# Patient Record
Sex: MALE | Race: WHITE | NOT HISPANIC OR LATINO | Employment: FULL TIME | URBAN - METROPOLITAN AREA
[De-identification: names, ages, dates, MRNs, and addresses within clinical notes are randomized per-mention and may not be internally consistent; named-entity substitution may affect disease eponyms.]

---

## 2022-01-26 ENCOUNTER — OFFICE VISIT (OUTPATIENT)
Dept: URGENT CARE | Facility: PHYSICIAN GROUP | Age: 22
End: 2022-01-26
Payer: COMMERCIAL

## 2022-01-26 VITALS
HEIGHT: 72 IN | WEIGHT: 203 LBS | HEART RATE: 95 BPM | SYSTOLIC BLOOD PRESSURE: 118 MMHG | DIASTOLIC BLOOD PRESSURE: 86 MMHG | OXYGEN SATURATION: 97 % | RESPIRATION RATE: 20 BRPM | TEMPERATURE: 97.8 F | BODY MASS INDEX: 27.5 KG/M2

## 2022-01-26 DIAGNOSIS — M54.32 SCIATICA OF LEFT SIDE: ICD-10-CM

## 2022-01-26 PROCEDURE — 99214 OFFICE O/P EST MOD 30 MIN: CPT | Performed by: NURSE PRACTITIONER

## 2022-01-26 RX ORDER — KETOROLAC TROMETHAMINE 30 MG/ML
30 INJECTION, SOLUTION INTRAMUSCULAR; INTRAVENOUS ONCE
Status: COMPLETED | OUTPATIENT
Start: 2022-01-26 | End: 2022-01-26

## 2022-01-26 RX ORDER — CYCLOBENZAPRINE HCL 5 MG
5-10 TABLET ORAL
Qty: 15 TABLET | Refills: 0 | Status: SHIPPED | OUTPATIENT
Start: 2022-01-26 | End: 2022-01-31

## 2022-01-26 RX ADMIN — KETOROLAC TROMETHAMINE 30 MG: 30 INJECTION, SOLUTION INTRAMUSCULAR; INTRAVENOUS at 14:15

## 2022-01-26 ASSESSMENT — ENCOUNTER SYMPTOMS
BOWEL INCONTINENCE: 0
PARESTHESIAS: 1
TINGLING: 1
NUMBNESS: 1
LEG PAIN: 1
BACK PAIN: 1

## 2022-01-26 NOTE — PROGRESS NOTES
Subjective:     Manuel Leon is a 21 y.o. male who presents for Back Pain (cant sit, consitant cramping in lower back, numb left leg x1 hour ago)      Back Pain  This is a new problem. The current episode started today (Bennie is a pleasant 21 year old male who presents to  today with complaints of left sided back pain that started today while pumping gas. He states he developed severe left lower back pain that radiates down left buttock and thigh. ). The pain is present in the lumbar spine and sacro-iliac. The quality of the pain is described as aching and shooting. The pain is at a severity of 9/10. Associated symptoms include leg pain, numbness, paresthesias and tingling. Pertinent negatives include no bladder incontinence or bowel incontinence. He has tried ice for the symptoms. The treatment provided mild relief.     He states he does have a history of left sided lumbar pain for the past 2 years. This pain is intermittent an dis usually resolves with stretching.     Review of Systems   Gastrointestinal: Negative for bowel incontinence.   Genitourinary: Negative for bladder incontinence.   Musculoskeletal: Positive for back pain.   Neurological: Positive for tingling, numbness and paresthesias.       PMH: History reviewed. No pertinent past medical history.  ALLERGIES: No Known Allergies  SURGHX: History reviewed. No pertinent surgical history.  SOCHX:   Social History     Socioeconomic History   • Marital status: Single     Spouse name: Not on file   • Number of children: Not on file   • Years of education: Not on file   • Highest education level: Not on file   Occupational History   • Not on file   Tobacco Use   • Smoking status: Never Smoker   • Smokeless tobacco: Never Used   Vaping Use   • Vaping Use: Never used   Substance and Sexual Activity   • Alcohol use: Yes     Comment: socially    • Drug use: Not Currently   • Sexual activity: Not on file   Other Topics Concern   • Not on file   Social History  Narrative   • Not on file     Social Determinants of Health     Financial Resource Strain:    • Difficulty of Paying Living Expenses: Not on file   Food Insecurity:    • Worried About Running Out of Food in the Last Year: Not on file   • Ran Out of Food in the Last Year: Not on file   Transportation Needs:    • Lack of Transportation (Medical): Not on file   • Lack of Transportation (Non-Medical): Not on file   Physical Activity:    • Days of Exercise per Week: Not on file   • Minutes of Exercise per Session: Not on file   Stress:    • Feeling of Stress : Not on file   Social Connections:    • Frequency of Communication with Friends and Family: Not on file   • Frequency of Social Gatherings with Friends and Family: Not on file   • Attends Worship Services: Not on file   • Active Member of Clubs or Organizations: Not on file   • Attends Club or Organization Meetings: Not on file   • Marital Status: Not on file   Intimate Partner Violence:    • Fear of Current or Ex-Partner: Not on file   • Emotionally Abused: Not on file   • Physically Abused: Not on file   • Sexually Abused: Not on file   Housing Stability:    • Unable to Pay for Housing in the Last Year: Not on file   • Number of Places Lived in the Last Year: Not on file   • Unstable Housing in the Last Year: Not on file     FH:   Family History   Problem Relation Age of Onset   • Non-contributory Mother    • Non-contributory Father          Objective:   /86   Pulse 95   Temp 36.6 °C (97.8 °F) (Temporal)   Resp 20   Ht 1.829 m (6')   Wt 92.1 kg (203 lb)   SpO2 97%   BMI 27.53 kg/m²     Physical Exam  Vitals and nursing note reviewed.   Constitutional:       General: He is not in acute distress.     Appearance: Normal appearance. He is not ill-appearing.   HENT:      Head: Normocephalic and atraumatic.      Right Ear: External ear normal.      Left Ear: External ear normal.      Nose: No congestion or rhinorrhea.      Mouth/Throat:      Mouth: Mucous  membranes are moist.   Eyes:      Extraocular Movements: Extraocular movements intact.      Pupils: Pupils are equal, round, and reactive to light.   Cardiovascular:      Rate and Rhythm: Normal rate and regular rhythm.      Pulses: Normal pulses.      Heart sounds: Normal heart sounds.   Pulmonary:      Effort: Pulmonary effort is normal.      Breath sounds: Normal breath sounds.   Abdominal:      General: Abdomen is flat. Bowel sounds are normal.      Palpations: Abdomen is soft.      Tenderness: There is no abdominal tenderness. There is no right CVA tenderness or left CVA tenderness.   Musculoskeletal:      Cervical back: Normal range of motion and neck supple.      Lumbar back: Tenderness present. No swelling, edema, deformity, signs of trauma, lacerations, spasms or bony tenderness. Decreased range of motion. Positive right straight leg raise test and positive left straight leg raise test. No scoliosis.        Back:       Comments: Positive antalgic gait and difficulty getting on and off exam table.    Skin:     General: Skin is warm and dry.      Capillary Refill: Capillary refill takes less than 2 seconds.   Neurological:      General: No focal deficit present.      Mental Status: He is alert and oriented to person, place, and time. Mental status is at baseline.   Psychiatric:         Mood and Affect: Mood normal.         Behavior: Behavior normal.         Thought Content: Thought content normal.         Judgment: Judgment normal.         Assessment/Plan:   Assessment    1. Sciatica of left side  ketorolac (TORADOL) injection 30 mg    cyclobenzaprine (FLEXERIL) 5 mg tablet    Referral to Physical Therapy     I encouraged pt to use supportive measures including rest, massage and performing stretches. Apply heat and ice for additional relief. Toradol injection given in clinic. Pt tolerated this well. Pt was advised to refrain from additional NSAIDS for the next 48 hours following this injection. Acetaminophen  may be used every 4 hours as needed for additional relief of pain. Pt educated not to exceed more than advised amount on bottle. Muscle relaxer sent to pharmacy for relief of back spasms. We discussed sedative effect of this medication and pt was advised to use only before bed.  Pt verbalizes understanding.  Follow up in clinic for worsening or persistent symptoms.     AVS handout given and reviewed with patient. Pt educated on red flags and when to seek treatment back in ER or UC.

## 2022-01-26 NOTE — PATIENT INSTRUCTIONS
Lumbar Strain  A lumbar strain, which is sometimes called a low-back strain, is a stretch or tear in a muscle or the strong cords of tissue that attach muscle to bone (tendons) in the lower back (lumbar spine). This type of injury occurs when muscles or tendons are torn or are stretched beyond their limits.  Lumbar strains can range from mild to severe. Mild strains may involve stretching a muscle or tendon without tearing it. These may heal in 1-2 weeks. More severe strains involve tearing of muscle fibers or tendons. These will cause more pain and may take 6-8 weeks to heal.  What are the causes?  This condition may be caused by:  · Trauma, such as a fall or a hit to the body.  · Twisting or overstretching the back. This may result from doing activities that need a lot of energy, such as lifting heavy objects.  What increases the risk?  This injury is more common in:  · Athletes.  · People with obesity.  · People who do repeated lifting, bending, or other movements that involve their back.  What are the signs or symptoms?  Symptoms of this condition may include:  · Sharp or dull pain in the lower back that does not go away. The pain may extend to the buttocks.  · Stiffness or limited range of motion.  · Sudden muscle tightening (spasms).  How is this diagnosed?  This condition may be diagnosed based on:  · Your symptoms.  · Your medical history.  · A physical exam.  · Imaging tests, such as:  ? X-rays.  ? MRI.  How is this treated?  Treatment for this condition may include:  · Rest.  · Applying heat and cold to the affected area.  · Over-the-counter medicines to help relieve pain and inflammation, such as NSAIDs.  · Prescription pain medicine and muscle relaxants may be needed for a short time.  · Physical therapy.  Follow these instructions at home:  Managing pain, stiffness, and swelling         · If directed, put ice on the injured area during the first 24 hours after your injury.  ? Put ice in a plastic  bag.  ? Place a towel between your skin and the bag.  ? Leave the ice on for 20 minutes, 2-3 times a day.  · If directed, apply heat to the affected area as often as told by your health care provider. Use the heat source that your health care provider recommends, such as a moist heat pack or a heating pad.  ? Place a towel between your skin and the heat source.  ? Leave the heat on for 20-30 minutes.  ? Remove the heat if your skin turns bright red. This is especially important if you are unable to feel pain, heat, or cold. You may have a greater risk of getting burned.  Activity  · Rest and return to your normal activities as told by your health care provider. Ask your health care provider what activities are safe for you.  · Do exercises as told by your health care provider.  Medicines  · Take over-the-counter and prescription medicines only as told by your health care provider.  · Ask your health care provider if the medicine prescribed to you:  ? Requires you to avoid driving or using heavy machinery.  ? Can cause constipation. You may need to take these actions to prevent or treat constipation:  § Drink enough fluid to keep your urine pale yellow.  § Take over-the-counter or prescription medicines.  § Eat foods that are high in fiber, such as beans, whole grains, and fresh fruits and vegetables.  § Limit foods that are high in fat and processed sugars, such as fried or sweet foods.  Injury prevention  To prevent a future low-back injury:  · Always warm up properly before physical activity or sports.  · Cool down and stretch after being active.  · Use correct form when playing sports and lifting heavy objects. Bend your knees before you lift heavy objects.  · Use good posture when sitting and standing.  · Stay physically fit and keep a healthy weight.  ? Do at least 150 minutes of moderate-intensity exercise each week, such as brisk walking or water aerobics.  ? Do strength exercises at least 2 times each  week.    General instructions  · Do not use any products that contain nicotine or tobacco, such as cigarettes, e-cigarettes, and chewing tobacco. If you need help quitting, ask your health care provider.  · Keep all follow-up visits as told by your health care provider. This is important.  Contact a health care provider if:  · Your back pain does not improve after 6 weeks of treatment.  · Your symptoms get worse.  Get help right away if:  · Your back pain is severe.  · You are unable to stand or walk.  · You develop pain in your legs.  · You develop weakness in your buttocks or legs.  · You have difficulty controlling when you urinate or when you have a bowel movement.  ? You have frequent, painful, or bloody urination.  ? You have a temperature over 101.0°F (38.3°C)  Summary  · A lumbar strain, which is sometimes called a low-back strain, is a stretch or tear in a muscle or the strong cords of tissue that attach muscle to bone (tendons) in the lower back (lumbar spine).  · This type of injury occurs when muscles or tendons are torn or are stretched beyond their limits.  · Rest and return to your normal activities as told by your health care provider. If directed, apply heat and ice to the affected area as often as told by your health care provider.  · Take over-the-counter and prescription medicines only as told by your health care provider.  · Contact a health care provider if you have new or worsening symptoms.  This information is not intended to replace advice given to you by your health care provider. Make sure you discuss any questions you have with your health care provider.  Document Released: 12/18/2006 Document Revised: 10/17/2019 Document Reviewed: 10/17/2019  Elsevier Patient Education © 2020 [employer]  Sciatica    Sciatica is pain, weakness, tingling, or loss of feeling (numbness) along the sciatic nerve. The sciatic nerve starts in the lower back and goes down the back of each leg. Sciatica  usually goes away on its own or with treatment. Sometimes, sciatica may come back (recur).  What are the causes?  This condition happens when the sciatic nerve is pinched or has pressure put on it. This may be the result of:  · A disk in between the bones of the spine bulging out too far (herniated disk).  · Changes in the spinal disks that occur with aging.  · A condition that affects a muscle in the butt.  · Extra bone growth near the sciatic nerve.  · A break (fracture) of the area between your hip bones (pelvis).  · Pregnancy.  · Tumor. This is rare.  What increases the risk?  You are more likely to develop this condition if you:  · Play sports that put pressure or stress on the spine.  · Have poor strength and ease of movement (flexibility).  · Have had a back injury in the past.  · Have had back surgery.  · Sit for long periods of time.  · Do activities that involve bending or lifting over and over again.  · Are very overweight (obese).  What are the signs or symptoms?  Symptoms can vary from mild to very bad. They may include:  · Any of these problems in the lower back, leg, hip, or butt:  ? Mild tingling, loss of feeling, or dull aches.  ? Burning sensations.  ? Sharp pains.  · Loss of feeling in the back of the calf or the sole of the foot.  · Leg weakness.  · Very bad back pain that makes it hard to move.  These symptoms may get worse when you cough, sneeze, or laugh. They may also get worse when you sit or stand for long periods of time.  How is this treated?  This condition often gets better without any treatment. However, treatment may include:  · Changing or cutting back on physical activity when you have pain.  · Doing exercises and stretching.  · Putting ice or heat on the affected area.  · Medicines that help:  ? To relieve pain and swelling.  ? To relax your muscles.  · Shots (injections) of medicines that help to relieve pain, irritation, and swelling.  · Surgery.  Follow these instructions at  home:  Medicines  · Take over-the-counter and prescription medicines only as told by your doctor.  · Ask your doctor if the medicine prescribed to you:  ? Requires you to avoid driving or using heavy machinery.  ? Can cause trouble pooping (constipation). You may need to take these steps to prevent or treat trouble pooping:  § Drink enough fluids to keep your pee (urine) pale yellow.  § Take over-the-counter or prescription medicines.  § Eat foods that are high in fiber. These include beans, whole grains, and fresh fruits and vegetables.  § Limit foods that are high in fat and sugar. These include fried or sweet foods.  Managing pain         · If told, put ice on the affected area.  ? Put ice in a plastic bag.  ? Place a towel between your skin and the bag.  ? Leave the ice on for 20 minutes, 2-3 times a day.  · If told, put heat on the affected area. Use the heat source that your doctor tells you to use, such as a moist heat pack or a heating pad.  ? Place a towel between your skin and the heat source.  ? Leave the heat on for 20-30 minutes.  ? Remove the heat if your skin turns bright red. This is very important if you are unable to feel pain, heat, or cold. You may have a greater risk of getting burned.  Activity    · Return to your normal activities as told by your doctor. Ask your doctor what activities are safe for you.  · Avoid activities that make your symptoms worse.  · Take short rests during the day.  ? When you rest for a long time, do some physical activity or stretching between periods of rest.  ? Avoid sitting for a long time without moving. Get up and move around at least one time each hour.  · Exercise and stretch regularly, as told by your doctor.  · Do not lift anything that is heavier than 10 lb (4.5 kg) while you have symptoms of sciatica.  ? Avoid lifting heavy things even when you do not have symptoms.  ? Avoid lifting heavy things over and over.  · When you lift objects, always lift in a way  that is safe for your body. To do this, you should:  ? Bend your knees.  ? Keep the object close to your body.  ? Avoid twisting.  General instructions  · Stay at a healthy weight.  · Wear comfortable shoes that support your feet. Avoid wearing high heels.  · Avoid sleeping on a mattress that is too soft or too hard. You might have less pain if you sleep on a mattress that is firm enough to support your back.  · Keep all follow-up visits as told by your doctor. This is important.  Contact a doctor if:  · You have pain that:  ? Wakes you up when you are sleeping.  ? Gets worse when you lie down.  ? Is worse than the pain you have had in the past.  ? Lasts longer than 4 weeks.  · You lose weight without trying.  Get help right away if:  · You cannot control when you pee (urinate) or poop (have a bowel movement).  · You have weakness in any of these areas and it gets worse:  ? Lower back.  ? The area between your hip bones.  ? Butt.  ? Legs.  · You have redness or swelling of your back.  · You have a burning feeling when you pee.  Summary  · Sciatica is pain, weakness, tingling, or loss of feeling (numbness) along the sciatic nerve.  · This condition happens when the sciatic nerve is pinched or has pressure put on it.  · Sciatica can cause pain, tingling, or loss of feeling (numbness) in the lower back, legs, hips, and butt.  · Treatment often includes rest, exercise, medicines, and putting ice or heat on the affected area.  This information is not intended to replace advice given to you by your health care provider. Make sure you discuss any questions you have with your health care provider.  Document Released: 09/26/2009 Document Revised: 01/06/2020 Document Reviewed: 01/06/2020  Elsevier Patient Education © 2020 [employer] Inc[employer]

## 2022-01-27 ENCOUNTER — HOSPITAL ENCOUNTER (EMERGENCY)
Facility: MEDICAL CENTER | Age: 22
End: 2022-01-27
Attending: EMERGENCY MEDICINE
Payer: COMMERCIAL

## 2022-01-27 VITALS
SYSTOLIC BLOOD PRESSURE: 136 MMHG | TEMPERATURE: 99 F | HEIGHT: 72 IN | OXYGEN SATURATION: 96 % | WEIGHT: 203.48 LBS | RESPIRATION RATE: 18 BRPM | HEART RATE: 86 BPM | BODY MASS INDEX: 27.56 KG/M2 | DIASTOLIC BLOOD PRESSURE: 72 MMHG

## 2022-01-27 DIAGNOSIS — M54.32 SCIATICA OF LEFT SIDE: Primary | ICD-10-CM

## 2022-01-27 PROCEDURE — 99282 EMERGENCY DEPT VISIT SF MDM: CPT

## 2022-01-27 ASSESSMENT — ENCOUNTER SYMPTOMS
ABDOMINAL PAIN: 0
BACK PAIN: 1

## 2022-01-28 NOTE — ED PROVIDER NOTES
ED Provider Note    Scribed for JONATHAN Jain II* by Maritza Vicente. 1/27/2022  4:59 PM    Means of Arrival: Walk in   History obtained by: Patient  Limitations: None     CHIEF COMPLAINT  Chief Complaint   Patient presents with   • Low Back Pain     shoots down L leg       HPI  Manuel Leon is a 21 y.o. male who presents to the Emergency Department for evaluation of worsening radiating lower back pain onset 2 days ago. Patient reports he has history of lower back pain and was seen at Urgent Care yesterday after experiencing a sudden shooting pain going down his left leg from his lower back. At Urgent Care, he was administered Flexeril and Toradol.  He adds that this episode of back pain is worse than his usual episodes. In the ED, he feels more improved than onset. He adds that he went to the [employer] 4 days ago where he states a kid jumped on him. He states that he didn't feel any immediate pain. Patient states that his pain worsens when he tries to walk or stand up and his pain is alleviated with laying down. He presents associated symptoms of testicular pain.  Dull ache.  Denies lower abdominal pain, dysuria, or penile discharge. He denies any new sexual partners.  Patient has been taking Tylenol intermittently with mild alleviation.  He took Flexeril and did not like how it made him feel drowsy.      REVIEW OF SYSTEMS  Review of Systems   Gastrointestinal: Negative for abdominal pain (lower).   Genitourinary: Negative for dysuria.        Testicular pain  No penile discharge   Musculoskeletal: Positive for back pain (lower ).        Left leg pain   All other systems reviewed and are negative.    See HPI for further details.    PAST MEDICAL HISTORY   Previous episodes of sciatica    SOCIAL HISTORY  Social History     Tobacco Use   • Smoking status: Never Smoker   • Smokeless tobacco: Never Used   Vaping Use   • Vaping Use: Never used   Substance and Sexual Activity   • Alcohol use: Yes      Comment: socially    • Drug use: Not Currently   • Sexual activity:        SURGICAL HISTORY  patient denies any surgical history    CURRENT MEDICATIONS  Current Outpatient Medications   Medication Instructions   • cyclobenzaprine (FLEXERIL) 5-10 mg, Oral, NIGHTLY PRN - MAY REPEAT  X 1        ALLERGIES  No Known Allergies    PHYSICAL EXAM  VITAL SIGNS: BP (!) 163/104   Pulse (!) 105   Temp 36.7 °C (98 °F) (Temporal)   Resp 18   Ht 1.829 m (6')   Wt 92.3 kg (203 lb 7.8 oz)   SpO2 97%   BMI 27.60 kg/m²     Pulse ox interpretation: I interpret this pulse ox as normal.  Constitutional: Well-nourished 21-year-old man appears to be in pain.  HENT: No signs of trauma, Bilateral external ears normal, Nose normal.   Eyes: Pupils are equal, Conjunctiva normal, Non-icteric.   Cardiovascular: Regular rate and rhythm, no murmurs. Symmetric distal pulses. No cyanosis of extremities. No peripheral edema of extremities.  Thorax & Lungs: Normal breath sounds, No respiratory distress, No wheezing, No chest tenderness.   Abdomen: Bowel sounds normal, Soft, no abdominal tenderness.  : Normal scrotum and testicles with no discharge. No hernias.  No lymphadenopathy at the inguinal region.  Skin: Warm, Dry, No erythema, No rash.   Back: No midline bony tenderness, No CVA tenderness. Straight leg raise irritates back  Musculoskeletal: Good range of motion in all major joints.   Neurologic: Alert , Normal sensory function, No focal deficits noted. Reflexes in lower extremities and deep tendon reflex are equal. Brisk bilateral pulses.   Psychiatric: Affect normal, Judgment normal, Mood normal.     COURSE & MEDICAL DECISION MAKING  Pertinent Labs & Imaging studies reviewed. (See chart for details)    4:59 PM This is a 21 y.o. male who presents with history of lower back pain presenting with radiating lower back pain onset 2 days ago. Patient evaluated at bedside. Differential diagnoses include but not limited to: Sciatica, or lumbar  strain.  Presentation very consistent with sciatica.  Informed patient to stay mobile to avoid muscle tightening which can increase patient's pain. Counseled patient on OTC prescriptions and its dosages for alleviation. Discussed with patient about possible outpatient follow up with [facility] if he has persistent minor symptoms.  If he has weakness, fevers he needs to come back to the ER.  He did mention having a dull ache at his testicles.   exam is normal.  I discussed with patient care of plan following discharge. Patient was given opportunity to ask questions at this time.  Patient verbalizes understanding and agrees to care of plan.  Patient will be discharged at this time. Patient will be discharged with a referral to Spine and ED. His vital signs prior to discharge: BP (!) 163/104   Pulse (!) 105   Temp 36.7 °C (98 °F) (Temporal)   Resp 18   Ht 1.829 m (6')   Wt 92.3 kg (203 lb 7.8 oz)   SpO2 97%   BMI 27.60 kg/m²     The patient will return for worsening symptoms and is stable at the time of discharge. The patient verbalizes understanding and will comply. Guidance provided on appropriate use of medications.      DISPOSITION:  Patient will be discharged home in stable condition.    FOLLOW UP:  Atilio Maravilla M.D.  [address]  [address]  [facility] [address] [phone]    Schedule an appointment as soon as possible for a visit   If pain persists for more than 1 week, and no serious additional symptoms such as fever, loss of strength    [facility], Emergency Dept  [address] [address] [phone]  Go to   fever, weakness in leg    FINAL IMPRESSION  1. Sciatica of left side Active      Maritza VERMA (Layla), am scribing for, and in the presence of, MIKEY Jain II.    Electronically signed by: Maritza Woodall), 1/27/2022    Krishna VERMA II, M* personally performed the services described in this documentation, as  scribed by Maritza Vicente in my presence, and it is both accurate and complete.    E    The note accurately reflects work and decisions made by me.  Krishna Mathews II, M.D.  1/28/2022  12:23 AM

## 2022-01-28 NOTE — DISCHARGE INSTRUCTIONS
For pain take ibuprofen (advil) 600mg every 8 hours for at least the next 3-5 days. Be sure to take with food. You can also take 650mg of tylenol every 8 hours for additional treatment of pain. Take prescribed flexeril before bed.

## 2022-01-28 NOTE — ED TRIAGE NOTES
Ambulates to triage  Chief Complaint   Patient presents with   • Low Back Pain     shoots down L leg     Pt has a hx of this back pain, went to a [employer] park 4 days ago, did not have pain at that time. After pumping gas yesterday he had a shooting pain.   Was seen at  yesterday, and was given flexeril and a toradol injection.

## 2022-11-10 ENCOUNTER — OFFICE VISIT (OUTPATIENT)
Dept: URGENT CARE | Facility: PHYSICIAN GROUP | Age: 22
End: 2022-11-10
Payer: COMMERCIAL

## 2022-11-10 VITALS
TEMPERATURE: 98.1 F | HEART RATE: 85 BPM | SYSTOLIC BLOOD PRESSURE: 136 MMHG | WEIGHT: 208 LBS | BODY MASS INDEX: 28.17 KG/M2 | DIASTOLIC BLOOD PRESSURE: 78 MMHG | RESPIRATION RATE: 16 BRPM | HEIGHT: 72 IN | OXYGEN SATURATION: 97 %

## 2022-11-10 DIAGNOSIS — M54.32 SCIATICA OF LEFT SIDE: ICD-10-CM

## 2022-11-10 PROCEDURE — 99214 OFFICE O/P EST MOD 30 MIN: CPT | Performed by: FAMILY MEDICINE

## 2022-11-10 RX ORDER — PREDNISONE 20 MG/1
40 TABLET ORAL DAILY
Qty: 10 TABLET | Refills: 0 | Status: SHIPPED | OUTPATIENT
Start: 2022-11-10 | End: 2022-11-15

## 2022-11-10 ASSESSMENT — ENCOUNTER SYMPTOMS
VOMITING: 0
NAUSEA: 0
MYALGIAS: 0
WEIGHT LOSS: 0
EYE DISCHARGE: 0
EYE REDNESS: 0

## 2022-11-10 NOTE — LETTER
November 10, 2022         Patient: Manuel Leon   YOB: 2000   Date of Visit: 11/10/2022           To Whom it May Concern:    Manuel Leon was seen in my clinic on 11/10/2022. Please excuse from work 11/10 through 11/12/2022.    Sincerely,           Luke Garrett M.D.  Electronically Signed

## 2022-11-10 NOTE — PROGRESS NOTES
Subjective     Manuel Leon is a 22 y.o. male who presents with Leg Pain (Sciatic pain shooting down left leg  x 4 days . )            Recurrent problem. Recurrent pain radiating from buttock to left leg. Severe. No fever. No trauma. No myelopathy. No PMH cancer. No unwanted weight loss. No other aggravating or alleviating factors.        Review of Systems   Constitutional:  Negative for malaise/fatigue and weight loss.   Eyes:  Negative for discharge and redness.   Gastrointestinal:  Negative for nausea and vomiting.   Musculoskeletal:  Negative for joint pain and myalgias.   Skin:  Negative for itching and rash.            Objective     /78 (BP Location: Left arm, Patient Position: Sitting, BP Cuff Size: Adult)   Pulse 85   Temp 36.7 °C (98.1 °F) (Temporal)   Resp 16   Ht 1.829 m (6')   Wt 94.3 kg (208 lb)   SpO2 97%   BMI 28.21 kg/m²      Physical Exam  Constitutional:       General: He is not in acute distress.     Appearance: He is well-developed.   HENT:      Head: Normocephalic and atraumatic.   Eyes:      Conjunctiva/sclera: Conjunctivae normal.   Musculoskeletal:        Legs:    Skin:     General: Skin is warm and dry.      Findings: No rash.   Neurological:      Mental Status: He is alert.      Deep Tendon Reflexes: Reflexes normal.      Comments: Bilateral lower extremity strength and sensory intact.  + straight leg raise.                             Assessment & Plan   [facility] visit 1/26/2022 reviewed  ED visit 1/27/2022 reviewed        1. Sciatica of left side  Referral to Pain Clinic    predniSONE (DELTASONE) 20 MG Tab    Referral to Physical Therapy        Differential diagnosis, natural history, supportive care, and indications for immediate follow-up discussed at length.     This is a recurrent problem. Will initiate PT and refer to physiatry for further evaluation and.

## 2022-11-11 ENCOUNTER — HOSPITAL ENCOUNTER (EMERGENCY)
Facility: MEDICAL CENTER | Age: 22
End: 2022-11-11
Attending: EMERGENCY MEDICINE
Payer: COMMERCIAL

## 2022-11-11 ENCOUNTER — APPOINTMENT (OUTPATIENT)
Dept: RADIOLOGY | Facility: MEDICAL CENTER | Age: 22
End: 2022-11-11
Attending: EMERGENCY MEDICINE
Payer: COMMERCIAL

## 2022-11-11 VITALS
RESPIRATION RATE: 18 BRPM | BODY MASS INDEX: 28.31 KG/M2 | WEIGHT: 209 LBS | HEIGHT: 72 IN | DIASTOLIC BLOOD PRESSURE: 87 MMHG | SYSTOLIC BLOOD PRESSURE: 138 MMHG | HEART RATE: 68 BPM | OXYGEN SATURATION: 96 % | TEMPERATURE: 97.5 F

## 2022-11-11 DIAGNOSIS — M54.41 ACUTE RIGHT-SIDED LOW BACK PAIN WITH RIGHT-SIDED SCIATICA: ICD-10-CM

## 2022-11-11 PROCEDURE — 700111 HCHG RX REV CODE 636 W/ 250 OVERRIDE (IP): Performed by: EMERGENCY MEDICINE

## 2022-11-11 PROCEDURE — 99283 EMERGENCY DEPT VISIT LOW MDM: CPT

## 2022-11-11 PROCEDURE — 96372 THER/PROPH/DIAG INJ SC/IM: CPT

## 2022-11-11 PROCEDURE — 72100 X-RAY EXAM L-S SPINE 2/3 VWS: CPT

## 2022-11-11 RX ORDER — DIAZEPAM 5 MG/1
5 TABLET ORAL ONCE
Status: DISCONTINUED | OUTPATIENT
Start: 2022-11-11 | End: 2022-11-11 | Stop reason: HOSPADM

## 2022-11-11 RX ORDER — IBUPROFEN 600 MG/1
600 TABLET ORAL EVERY 6 HOURS PRN
Qty: 30 TABLET | Refills: 0 | Status: SHIPPED | OUTPATIENT
Start: 2022-11-11

## 2022-11-11 RX ORDER — TIZANIDINE 4 MG/1
4 TABLET ORAL EVERY 6 HOURS PRN
Qty: 30 TABLET | Refills: 3 | Status: SHIPPED | OUTPATIENT
Start: 2022-11-11

## 2022-11-11 RX ORDER — KETOROLAC TROMETHAMINE 30 MG/ML
15 INJECTION, SOLUTION INTRAMUSCULAR; INTRAVENOUS ONCE
Status: COMPLETED | OUTPATIENT
Start: 2022-11-11 | End: 2022-11-11

## 2022-11-11 RX ADMIN — KETOROLAC TROMETHAMINE 15 MG: 30 INJECTION, SOLUTION INTRAMUSCULAR at 09:28

## 2022-11-11 NOTE — DISCHARGE INSTRUCTIONS
Your symptoms are most consistent with possible piriformis syndrome.  Take the medication as directed.  I strongly advise you to go to [facility] as they can often do an injection that can significantly relieve your symptoms.

## 2022-11-11 NOTE — ED TRIAGE NOTES
Amb to triage w/ c/o low back pain radiating down LLE x 1 wk.  Patient went to the chiropractor 4 days ago, states the pain worsened afterwards.  Patient went to  yesterday and was prescribed Prednisone and reports pain has worsened. + cms.

## 2022-11-11 NOTE — ED PROVIDER NOTES
ED Provider Note    CHIEF COMPLAINT  Chief Complaint   Patient presents with    Low Back Pain    Leg Pain       HPI  Manuel Leon is a 22 y.o. male who presents with chief complaint of low back pain.  Patient reports he has had a history of back pain in the past.  Back pain is mostly localized in his right low back and in his buttocks radiating down his right posterior leg.  Patient reports she has had pain since the beginning of the week, he went to his chiropractor and pain has failed to resolve, pain is worse when he bends over.  denies recent trauma  denies bowel or bladder incontinence  denies ivdu. denies fevers/constitutional sxs  denies saddle paresthesias  denies focal weakness/numbness  Able to ambulate      REVIEW OF SYSTEMS  ROS    See HPI for further details. All other systems are negative.     PAST MEDICAL HISTORY       SOCIAL HISTORY  Social History     Tobacco Use    Smoking status: Never    Smokeless tobacco: Never   Vaping Use    Vaping Use: Never used   Substance and Sexual Activity    Alcohol use: Yes     Comment: rare    Drug use: Not Currently    Sexual activity: Not on file       SURGICAL HISTORY  patient denies any surgical history    CURRENT MEDICATIONS  Home Medications       Reviewed by Austin Steel R.N. (Registered Nurse) on 11/11/22 at 0844  Med List Status: Not Addressed     Medication Last Dose Status   predniSONE (DELTASONE) 20 MG Tab 11/11/2022 Active                    ALLERGIES  No Known Allergies    PHYSICAL EXAM  Vitals:    11/11/22 0834   BP: (!) 142/97   Pulse: 83   Resp: 16   Temp: 36.2 °C (97.1 °F)   SpO2: 97%       Physical Exam  Constitutional:       Appearance: He is well-developed.   Eyes:      Conjunctiva/sclera: Conjunctivae normal.   Pulmonary:      Effort: Pulmonary effort is normal.   Musculoskeletal:      Cervical back: Normal range of motion and neck supple.      Comments: Cervical, thoracic, lumbar spine clear of any tenderness to palpation.  There is  some mild paraspinal tenderness of the lumbosacral spine on the right.  Strength is 5 out of 5 in distal and proximal musculature of lower extremities.  Sensation intact throughout.  Reflexes normal.  EHL is 5 out of 5   Skin:     General: Skin is warm.   Neurological:      Mental Status: He is alert and oriented to person, place, and time.   Psychiatric:         Behavior: Behavior normal.         DIAGNOSTIC STUDIES / PROCEDURES      RADIOLOGY  DX-LUMBAR SPINE-2 OR 3 VIEWS   Final Result      1.  Mild levoconvex scoliosis.   2.  No evidence of acute fracture or dislocation. If there is concern for occult fracture, cross-sectional imaging can be obtained.   3.  Mild disc space narrowing at L5-S1.              COURSE & MEDICAL DECISION MAKING  Pertinent Labs & Imaging studies reviewed. (See chart for details)  well appearing pt here with back pain, no clinical signs or symptoms of cauda equina. Neurologic exam is nl and pt is ambulatory. Highly unlikely epidural abscess in this patient w/o fevers or midline point tenderness. Pt without any recent LP, there is no recent trauma, pt with no coagulopathy  epidural hematoma highly unlikely  checking XR  Emergent MRI is not currently indicated, pt is in understanding to return if they develop weakness/numbness, saddle paresthesias, severe worsening of pain, bowel or bladder incontinence, fever, inability to ambulate or they have any other concerns.  Home with supportive care.  Patient can also follow-up with pain management for possible epidural.    FINAL IMPRESSION    1. Acute right-sided low back pain with right-sided sciatica            Electronically signed by: Abilio Allen M.D., 11/11/2022 9:03 AM

## 2022-11-11 NOTE — ED NOTES
Patient seen at bedside; they have no new complaints at this time and vital signs are stable. Patient given discharge paperwork and given opportunity to ask questions. Patient verbalized understanding of discharge instructions and return precautions. Patient ambulated from ED with steady gait; accompanied by family member.

## 2023-04-10 ENCOUNTER — OFFICE VISIT (OUTPATIENT)
Dept: URGENT CARE | Facility: PHYSICIAN GROUP | Age: 23
End: 2023-04-10
Payer: COMMERCIAL

## 2023-04-10 VITALS
HEIGHT: 72 IN | WEIGHT: 210 LBS | RESPIRATION RATE: 16 BRPM | SYSTOLIC BLOOD PRESSURE: 118 MMHG | BODY MASS INDEX: 28.44 KG/M2 | TEMPERATURE: 98.1 F | OXYGEN SATURATION: 96 % | DIASTOLIC BLOOD PRESSURE: 74 MMHG | HEART RATE: 78 BPM

## 2023-04-10 DIAGNOSIS — R05.3 PERSISTENT COUGH FOR 3 WEEKS OR LONGER: ICD-10-CM

## 2023-04-10 PROCEDURE — 99213 OFFICE O/P EST LOW 20 MIN: CPT | Performed by: NURSE PRACTITIONER

## 2023-04-10 RX ORDER — FLUTICASONE PROPIONATE 50 MCG
1 SPRAY, SUSPENSION (ML) NASAL DAILY
Qty: 16 G | Refills: 0 | Status: SHIPPED | OUTPATIENT
Start: 2023-04-10 | End: 2023-05-10

## 2023-04-10 RX ORDER — DEXTROMETHORPHAN HYDROBROMIDE AND PROMETHAZINE HYDROCHLORIDE 15; 6.25 MG/5ML; MG/5ML
5 SYRUP ORAL EVERY 4 HOURS PRN
Qty: 120 ML | Refills: 0 | Status: SHIPPED | OUTPATIENT
Start: 2023-04-10 | End: 2023-04-17

## 2023-04-10 RX ORDER — METHYLPREDNISOLONE 4 MG/1
TABLET ORAL
Qty: 21 TABLET | Refills: 0 | Status: SHIPPED | OUTPATIENT
Start: 2023-04-10

## 2023-04-10 RX ORDER — BENZONATATE 200 MG/1
200 CAPSULE ORAL 3 TIMES DAILY PRN
Qty: 60 CAPSULE | Refills: 0 | Status: SHIPPED | OUTPATIENT
Start: 2023-04-10

## 2023-04-10 ASSESSMENT — ENCOUNTER SYMPTOMS
GASTROINTESTINAL NEGATIVE: 1
SHORTNESS OF BREATH: 0
MUSCULOSKELETAL NEGATIVE: 1
CONSTITUTIONAL NEGATIVE: 1
WHEEZING: 0
SINUS PAIN: 0
COUGH: 1
CARDIOVASCULAR NEGATIVE: 1
SORE THROAT: 1
PSYCHIATRIC NEGATIVE: 1
EYES NEGATIVE: 1

## 2023-04-10 ASSESSMENT — COPD QUESTIONNAIRES: COPD: 0

## 2023-04-10 NOTE — LETTER
April 10, 2023    To Whom It May Concern:         This is confirmation that Manuel Leon attended his scheduled appointment with PAOLO Johnson on 4/10/23. Please excuse his absence on 4/8/2023.          If you have any questions please do not hesitate to call me at the phone number listed below.    Sincerely,          HAYDEN Johnson.  [phone]

## 2023-04-10 NOTE — PROGRESS NOTES
Subjective:     Manuel Leon is a 22 y.o. male who presents for Cough ( 4 weeks and been having a sore throat x 3 weeks and pain on upper back from coughing too much)      Cough  Associated symptoms include a sore throat. Pertinent negatives include no ear pain, shortness of breath or wheezing. There is no history of COPD.   Bennie is a very pleasant 22-year-old male presenting to clinic for a cough for 3 weeks along with some mild throat pain.  He states that he was recently traveling and he had a fever of 102.4 °F for 1 day.  He developed a cough the next day which has not gone away.  He states that he has some mild sore throat that is mostly due to coughing, he denies painful swallowing.  He does state one episode of having a coughing fit severe enough that made him throw up.  He also states that he has postnasal drip in the morning.  He denies any allergic type symptoms, but does state that he vapes nicotine.  He denies a subjective fever, headache, sinus pain/pressure, ear pain.  He also denies chest pain, palpitations, orthopnea.  He denies shortness of breath, wheezing, pleurisy.  He denies abdominal pain, N/V/D.    Review of Systems   Constitutional: Negative.    HENT:  Positive for sore throat. Negative for congestion, ear pain, nosebleeds and sinus pain.    Eyes: Negative.    Respiratory:  Positive for cough. Negative for shortness of breath and wheezing.    Cardiovascular: Negative.    Gastrointestinal: Negative.    Genitourinary: Negative.    Musculoskeletal: Negative.    Skin: Negative.    Psychiatric/Behavioral: Negative.       PMH: History reviewed. No pertinent past medical history.  ALLERGIES: No Known Allergies  SURGHX: History reviewed. No pertinent surgical history.  SOCHX:   Social History     Socioeconomic History    Marital status: Single   Tobacco Use    Smoking status: Never    Smokeless tobacco: Never   Vaping Use    Vaping Use: Never used   Substance and Sexual Activity    Alcohol use:  Yes     Comment: rare    Drug use: Not Currently     FH:   Family History   Problem Relation Age of Onset    Non-contributory Mother     Non-contributory Father          Objective:   /74 (BP Location: Left arm, Patient Position: Sitting, BP Cuff Size: Adult long)   Pulse 78   Temp 36.7 °C (98.1 °F) (Temporal)   Resp 16   Ht 1.829 m (6')   Wt 95.3 kg (210 lb)   SpO2 96%   BMI 28.48 kg/m²     Physical Exam  Vitals and nursing note reviewed.   Constitutional:       General: He is not in acute distress.     Appearance: Normal appearance. He is normal weight. He is not ill-appearing or toxic-appearing.   HENT:      Head: Normocephalic.      Right Ear: External ear normal.      Left Ear: External ear normal.      Nose: Nose normal.      Mouth/Throat:      Mouth: Mucous membranes are moist.      Comments: Positive cobblestoning of his posterior oropharynx  Eyes:      General:         Right eye: No discharge.         Left eye: No discharge.      Extraocular Movements: Extraocular movements intact.      Conjunctiva/sclera: Conjunctivae normal.      Pupils: Pupils are equal, round, and reactive to light.   Pulmonary:      Effort: Pulmonary effort is normal.      Breath sounds: Normal breath sounds.   Abdominal:      General: Abdomen is flat.   Musculoskeletal:         General: Normal range of motion.      Cervical back: Normal range of motion and neck supple. No rigidity.   Lymphadenopathy:      Cervical: No cervical adenopathy.   Skin:     General: Skin is warm and dry.   Neurological:      General: No focal deficit present.      Mental Status: He is alert and oriented to person, place, and time. Mental status is at baseline.   Psychiatric:         Mood and Affect: Mood normal.         Behavior: Behavior normal.         Judgment: Judgment normal.       Assessment/Plan:   Assessment    1. Persistent cough for 3 weeks or longer  methylPREDNISolone (MEDROL DOSEPAK) 4 MG Tablet Therapy Pack    fluticasone (FLONASE)  50 MCG/ACT nasal spray    benzonatate (TESSALON) 200 MG capsule    promethazine-dextromethorphan (PROMETHAZINE-DM) 6.25-15 MG/5ML syrup      Medication sent to preferred pharmacy.  Differential diagnoses discussed with patient.  Due to persistent cough that is now causing gagging and vomiting I do believe that he is suffering from bronchitis.  His cough may be also exacerbated cough due to allergies as he is suffering from postnasal drip in the morning and the cobblestoning seen during physical exam.  Patient denied referral to follow-up with primary care provider.  Patient instructed he can return to clinic in a week if his cough is not improved.  He had no other questions or concerns.